# Patient Record
Sex: MALE | Race: BLACK OR AFRICAN AMERICAN | NOT HISPANIC OR LATINO | Employment: OTHER | URBAN - METROPOLITAN AREA
[De-identification: names, ages, dates, MRNs, and addresses within clinical notes are randomized per-mention and may not be internally consistent; named-entity substitution may affect disease eponyms.]

---

## 2024-02-06 ENCOUNTER — APPOINTMENT (OUTPATIENT)
Dept: UROLOGY | Facility: CLINIC | Age: 84
End: 2024-02-06
Payer: MEDICARE

## 2024-02-06 NOTE — PROGRESS NOTES
HPI  83 y.o. M sp IPP replacement by Dr. Chowdary in 2017 here for erectile dysfunction.     Last visit 11/2022:  -healing well  -wound care and warning signs reviewed    Todays Visit:  #Erectile Dysfunction/malfunctioning IPP  -sp Removal of inflatable penile prosthesis cylinders and scrotal pump and insertion of penile malleable implants (22.5 cm) 9/15/22  -  -    -Patient had an inflatable penile implant inserted in 2017, stopped working. Pain with inflation.  - Libido/Desire: strong   - been on Testosterone /anabolic steroids? no   -Pain or curvature in penis when erect: no  -Premature or delayed ejaculation: delayed, patient states volume is diminished  -no hematuria  -no regular blood thinners  -no heart problems  -no prior abdominal surgeries     MRI 7/18/22 personally reviewed:  1. Asymmetric positioning of the penile cylinders within the corpora cavernosa, with posterior displacement of the left penile cylinder by approximately 1.7 cm distal to the tip of the glans and resultant leftward tilting of the penile shaft. 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  2. Fluid reservoir is located within the anterior right hemipelvis, with overall intact tubing  3. Moderate bilateral hydroceles with asymmetric atrophy of the right testicle.      Current Medications:  No current outpatient medications on file.     No current facility-administered medications for this visit.        PMH:  Past Medical History:   Diagnosis Date    Benign prostatic hyperplasia without lower urinary tract symptoms 09/02/2014    Enlarged prostate    Personal history of (healed) traumatic fracture 09/02/2014    History of fracture of pelvis    Unspecified cataract     Cataracts, both eyes       PSH:  Past Surgical History:   Procedure Laterality Date    COLONOSCOPY  09/02/2014    Complete Colonoscopy    OTHER SURGICAL HISTORY  09/02/2014    Laser Coagulation Of Prostate       FMH:  No family history on  file.    Allergies:  Not on File    Physical Exam       Assessment/Plan  #Malfunctioning IPP, sp removal of IPP and insertion of malleable implant  -well healed  -cleared to use     fu in 3 months    Scribe Attestation  By signing my name below, I, Carlie Toscano-Herbert, Scribe, attest that this documentation  has been prepared under the direction and in the presence of Vineet Tamez MD.

## 2024-03-22 ENCOUNTER — OFFICE VISIT (OUTPATIENT)
Dept: DERMATOLOGY | Facility: CLINIC | Age: 84
End: 2024-03-22
Payer: MEDICARE

## 2024-03-22 DIAGNOSIS — L72.0 EPIDERMAL CYST: Primary | ICD-10-CM

## 2024-03-22 DIAGNOSIS — L70.8 OTHER ACNE: ICD-10-CM

## 2024-03-22 DIAGNOSIS — L98.8 RHYTIDES: ICD-10-CM

## 2024-03-22 PROCEDURE — 1159F MED LIST DOCD IN RCRD: CPT | Performed by: STUDENT IN AN ORGANIZED HEALTH CARE EDUCATION/TRAINING PROGRAM

## 2024-03-22 PROCEDURE — 99203 OFFICE O/P NEW LOW 30 MIN: CPT | Performed by: STUDENT IN AN ORGANIZED HEALTH CARE EDUCATION/TRAINING PROGRAM

## 2024-03-22 ASSESSMENT — ITCH NUMERIC RATING SCALE: HOW SEVERE IS YOUR ITCHING?: 0

## 2024-03-22 ASSESSMENT — DERMATOLOGY PATIENT ASSESSMENT
DO YOU USE A TANNING BED: NO
HAVE YOU HAD OR DO YOU HAVE A STAPH INFECTION: NO
HAVE YOU HAD OR DO YOU HAVE VASCULAR DISEASE: NO
DO YOU USE SUNSCREEN: OCCASIONALLY
DO YOU HAVE ANY NEW OR CHANGING LESIONS: NO
ARE YOU AN ORGAN TRANSPLANT RECIPIENT: NO

## 2024-03-22 ASSESSMENT — DERMATOLOGY QUALITY OF LIFE (QOL) ASSESSMENT
RATE HOW BOTHERED YOU ARE BY SYMPTOMS OF YOUR SKIN PROBLEM (EG, ITCHING, STINGING BURNING, HURTING OR SKIN IRRITATION): 0 - NEVER BOTHERED
DATE THE QUALITY-OF-LIFE ASSESSMENT WAS COMPLETED: 66921
RATE HOW EMOTIONALLY BOTHERED YOU ARE BY YOUR SKIN PROBLEM (FOR EXAMPLE, WORRY, EMBARRASSMENT, FRUSTRATION): 0 - NEVER BOTHERED
RATE HOW BOTHERED YOU ARE BY EFFECTS OF YOUR SKIN PROBLEMS ON YOUR ACTIVITIES (EG, GOING OUT, ACCOMPLISHING WHAT YOU WANT, WORK ACTIVITIES OR YOUR RELATIONSHIPS WITH OTHERS): 0 - NEVER BOTHERED
ARE THERE EXCLUSIONS OR EXCEPTIONS FOR THE QUALITY OF LIFE ASSESSMENT: NO

## 2024-03-22 ASSESSMENT — PATIENT GLOBAL ASSESSMENT (PGA): PATIENT GLOBAL ASSESSMENT: PATIENT GLOBAL ASSESSMENT:  1 - CLEAR

## 2024-03-22 NOTE — PROGRESS NOTES
Subjective     Gustavo Ramirez is a 83 y.o. male who presents for the following: Acne (Forehead).     Review of Systems:  No other skin or systemic complaints other than what is documented elsewhere in the note.    The following portions of the chart were reviewed this encounter and updated as appropriate:          Skin Cancer History  No skin cancer on file.      Specialty Problems    None       Objective   Well appearing patient in no apparent distress; mood and affect are within normal limits.    A focused skin examination was performed. All findings within normal limits unless otherwise noted below.    Assessment/Plan   1. Epidermal cyst  Left Zygomatic Area  4mm cystic papule    Patient wouldlikecosmeticremoval  Will chedule     Related Procedures  Follow Up In Dermatology - Established Patient    2. Rhytides (2)  Left Zygomatic Area, Right Temple  Lateralcanthus with slight skin sagging    If patient would like ,can see occuloplastic surgeon to help     3. Other acne  Mid Forehead  Cystic papules on forehead    Recommenddiffrein gel OTC

## 2024-03-22 NOTE — PATIENT INSTRUCTIONS
1) please purchase Differin 0.1% gel from over the counter  Its very drying  Use a very thin layer on affected bumps on forehead once weekly for 4 weeks  Then twice weekly for 4 weeks  Then try every 2 days   If its too drying, follow it with Cerave cream moisturizer    2) as far as the skin around the eyes, an occuloplastic surgeon is the right doctor to help with it  I dont have any one name-  My suggestion,call [facility] scheduling and ask to make appointment with occuloplastic surgeon and they can meet you to discuss - tell them why you are calling    3) regarding the bump under your eye- its a healthy little cyst  I am happy to remove it for you  Please make an appointment, just a regular follow up is ok but ideally id like 30 minutes for it  I would have to make a small incision and remove it

## 2024-08-13 ENCOUNTER — APPOINTMENT (OUTPATIENT)
Dept: UROLOGY | Facility: CLINIC | Age: 84
End: 2024-08-13
Payer: MEDICARE

## 2024-08-13 VITALS — TEMPERATURE: 97.2 F | BODY MASS INDEX: 24.11 KG/M2 | WEIGHT: 150 LBS | HEIGHT: 66 IN

## 2024-08-13 DIAGNOSIS — N52.9 ERECTILE DYSFUNCTION, UNSPECIFIED ERECTILE DYSFUNCTION TYPE: Primary | ICD-10-CM

## 2024-08-13 DIAGNOSIS — Z96.0 HISTORY OF PENILE IMPLANT: ICD-10-CM

## 2024-08-13 LAB
POC APPEARANCE, URINE: CLEAR
POC BILIRUBIN, URINE: NEGATIVE
POC BLOOD, URINE: ABNORMAL
POC COLOR, URINE: YELLOW
POC GLUCOSE, URINE: NEGATIVE MG/DL
POC KETONES, URINE: NEGATIVE MG/DL
POC LEUKOCYTES, URINE: NEGATIVE
POC NITRITE,URINE: NEGATIVE
POC PH, URINE: 6 PH
POC PROTEIN, URINE: NEGATIVE MG/DL
POC SPECIFIC GRAVITY, URINE: 1.02
POC UROBILINOGEN, URINE: 0.2 EU/DL

## 2024-08-13 PROCEDURE — 1036F TOBACCO NON-USER: CPT | Performed by: UROLOGY

## 2024-08-13 PROCEDURE — 81002 URINALYSIS NONAUTO W/O SCOPE: CPT | Performed by: UROLOGY

## 2024-08-13 PROCEDURE — 99213 OFFICE O/P EST LOW 20 MIN: CPT | Performed by: UROLOGY

## 2024-08-13 PROCEDURE — 1125F AMNT PAIN NOTED PAIN PRSNT: CPT | Performed by: UROLOGY

## 2024-08-13 RX ORDER — ASPIRIN 325 MG
50000 TABLET, DELAYED RELEASE (ENTERIC COATED) ORAL WEEKLY
COMMUNITY
Start: 2024-07-11

## 2024-08-13 RX ORDER — LANOLIN ALCOHOL/MO/W.PET/CERES
1000 CREAM (GRAM) TOPICAL
COMMUNITY
Start: 2024-05-10

## 2024-08-13 RX ORDER — TAMSULOSIN HYDROCHLORIDE 0.4 MG/1
CAPSULE ORAL
COMMUNITY
Start: 2024-05-28

## 2024-08-13 ASSESSMENT — PAIN SCALES - GENERAL: PAINLEVEL: 7
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
## 2024-08-13 NOTE — PROGRESS NOTES
"Last visit    Today's visit:        Labs  No results found for: \"TESTOSTERONE\"  No results found for: \"LH\"  No results found for: \"FSH\"  No components found for: \"ESTRADIAL\"  No results found for: \"PSA\"  No components found for: \"CBC\"  No results found for: \"PROLACTIN\"  Lab Results   Component Value Date    HGBA1C 5.9 (H) 04/26/2024     No components found for: \"HEMATOCRIT\"      Medications:  No current outpatient medications on file.    Allergy:  No Known Allergies     Exam  CONSTITUTIONAL:        No acute distress    HEAD:        Normocephalic and atraumatic    CHEST / RESPIRATORY      no excess work of breathing, no respiratory distress,    ABDOMEN / GASTROINTESTINAL:        Abdomen nondistended          Assessment/Plan       Scribe Attestation  By signing my name below, I, Kelly Driscoll, Scribe   attest that this documentation has been prepared under the direction and in the presence of Vineet Tamez MD.   "

## 2024-08-29 ENCOUNTER — HOSPITAL ENCOUNTER (OUTPATIENT)
Dept: RADIOLOGY | Facility: CLINIC | Age: 84
End: 2024-08-29
Payer: MEDICARE

## 2024-09-05 ENCOUNTER — HOSPITAL ENCOUNTER (OUTPATIENT)
Dept: RADIOLOGY | Facility: HOSPITAL | Age: 84
Discharge: HOME | End: 2024-09-05
Payer: MEDICARE

## 2024-09-05 DIAGNOSIS — Z96.0 HISTORY OF PENILE IMPLANT: ICD-10-CM

## 2024-09-05 DIAGNOSIS — N52.9 ERECTILE DYSFUNCTION, UNSPECIFIED ERECTILE DYSFUNCTION TYPE: ICD-10-CM

## 2024-09-05 PROCEDURE — 72197 MRI PELVIS W/O & W/DYE: CPT

## 2024-09-05 PROCEDURE — A9575 INJ GADOTERATE MEGLUMI 0.1ML: HCPCS | Mod: SE | Performed by: UROLOGY

## 2024-09-05 PROCEDURE — 2550000001 HC RX 255 CONTRASTS: Mod: SE | Performed by: UROLOGY

## 2024-09-05 RX ORDER — GADOTERATE MEGLUMINE 376.9 MG/ML
14 INJECTION INTRAVENOUS
Status: COMPLETED | OUTPATIENT
Start: 2024-09-05 | End: 2024-09-05

## 2024-09-17 ENCOUNTER — APPOINTMENT (OUTPATIENT)
Dept: UROLOGY | Facility: CLINIC | Age: 84
End: 2024-09-17
Payer: MEDICARE

## 2024-09-17 VITALS — TEMPERATURE: 97.8 F

## 2024-09-17 DIAGNOSIS — R39.9 LOWER URINARY TRACT SYMPTOMS (LUTS): ICD-10-CM

## 2024-09-17 DIAGNOSIS — T83.410S FAILURE OF PENILE IMPLANT, SEQUELA: Primary | ICD-10-CM

## 2024-09-17 DIAGNOSIS — R39.89 OTHER SYMPTOMS AND SIGNS INVOLVING THE GENITOURINARY SYSTEM: ICD-10-CM

## 2024-09-17 PROCEDURE — 99215 OFFICE O/P EST HI 40 MIN: CPT | Performed by: UROLOGY

## 2024-09-17 PROCEDURE — 87086 URINE CULTURE/COLONY COUNT: CPT

## 2024-09-17 PROCEDURE — 1159F MED LIST DOCD IN RCRD: CPT | Performed by: UROLOGY

## 2024-09-17 PROCEDURE — 1126F AMNT PAIN NOTED NONE PRSNT: CPT | Performed by: UROLOGY

## 2024-09-17 PROCEDURE — 1036F TOBACCO NON-USER: CPT | Performed by: UROLOGY

## 2024-09-17 RX ORDER — TAMSULOSIN HYDROCHLORIDE 0.4 MG/1
0.4 CAPSULE ORAL DAILY
Qty: 90 CAPSULE | Refills: 3 | Status: SHIPPED | OUTPATIENT
Start: 2024-09-17

## 2024-09-17 ASSESSMENT — PAIN SCALES - GENERAL: PAINLEVEL: 0-NO PAIN
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
## 2024-09-18 LAB — BACTERIA UR CULT: NO GROWTH

## 2024-09-23 ENCOUNTER — APPOINTMENT (OUTPATIENT)
Dept: DERMATOLOGY | Facility: CLINIC | Age: 84
End: 2024-09-23
Payer: MEDICARE

## 2024-09-25 ENCOUNTER — PREP FOR PROCEDURE (OUTPATIENT)
Dept: UROLOGY | Facility: CLINIC | Age: 84
End: 2024-09-25
Payer: MEDICARE

## 2024-09-25 VITALS — BODY MASS INDEX: 24.27 KG/M2 | WEIGHT: 154.98 LBS

## 2024-09-25 DIAGNOSIS — T83.420S: Primary | ICD-10-CM

## 2024-09-25 PROBLEM — T83.420A: Status: ACTIVE | Noted: 2024-09-25

## 2024-09-25 RX ORDER — CHLORHEXIDINE GLUCONATE 40 MG/ML
SOLUTION TOPICAL DAILY PRN
OUTPATIENT
Start: 2024-09-25

## 2024-09-25 RX ORDER — VANCOMYCIN HYDROCHLORIDE 1 G/200ML
1000 INJECTION, SOLUTION INTRAVENOUS ONCE
OUTPATIENT
Start: 2024-09-25 | End: 2024-09-25

## 2024-09-25 RX ORDER — CELECOXIB 400 MG/1
400 CAPSULE ORAL ONCE
OUTPATIENT
Start: 2024-09-25 | End: 2024-09-25

## 2024-09-25 RX ORDER — GABAPENTIN 300 MG/1
600 CAPSULE ORAL ONCE
OUTPATIENT
Start: 2024-09-25 | End: 2024-09-25

## 2024-09-25 RX ORDER — SODIUM CHLORIDE, SODIUM LACTATE, POTASSIUM CHLORIDE, CALCIUM CHLORIDE 600; 310; 30; 20 MG/100ML; MG/100ML; MG/100ML; MG/100ML
20 INJECTION, SOLUTION INTRAVENOUS CONTINUOUS
OUTPATIENT
Start: 2024-09-25

## 2024-09-25 RX ORDER — ACETAMINOPHEN 325 MG/1
975 TABLET ORAL ONCE
OUTPATIENT
Start: 2024-09-25 | End: 2024-09-25

## 2024-11-06 ENCOUNTER — CLINICAL SUPPORT (OUTPATIENT)
Dept: PREADMISSION TESTING | Facility: HOSPITAL | Age: 84
End: 2024-11-06
Payer: MEDICARE

## 2024-11-06 DIAGNOSIS — T83.420S: ICD-10-CM

## 2024-11-06 RX ORDER — PANTOPRAZOLE SODIUM 40 MG/1
40 TABLET, DELAYED RELEASE ORAL
COMMUNITY
Start: 2024-10-23

## 2024-11-06 RX ORDER — LORATADINE 10 MG/1
10 TABLET ORAL AS NEEDED
COMMUNITY
Start: 2015-05-26 | End: 2024-11-13 | Stop reason: WASHOUT

## 2024-11-06 RX ORDER — TERBINAFINE HYDROCHLORIDE 250 MG/1
TABLET ORAL
COMMUNITY
Start: 2024-04-19 | End: 2024-11-06 | Stop reason: WASHOUT

## 2024-11-13 ENCOUNTER — LAB (OUTPATIENT)
Dept: LAB | Facility: LAB | Age: 84
End: 2024-11-13
Payer: MEDICARE

## 2024-11-13 ENCOUNTER — APPOINTMENT (OUTPATIENT)
Dept: UROLOGY | Facility: CLINIC | Age: 84
End: 2024-11-13
Payer: MEDICARE

## 2024-11-13 ENCOUNTER — PRE-ADMISSION TESTING (OUTPATIENT)
Dept: PREADMISSION TESTING | Facility: HOSPITAL | Age: 84
End: 2024-11-13
Payer: MEDICARE

## 2024-11-13 VITALS
TEMPERATURE: 97 F | SYSTOLIC BLOOD PRESSURE: 142 MMHG | HEIGHT: 67 IN | BODY MASS INDEX: 24.01 KG/M2 | DIASTOLIC BLOOD PRESSURE: 79 MMHG | RESPIRATION RATE: 16 BRPM | WEIGHT: 153 LBS | OXYGEN SATURATION: 98 % | HEART RATE: 72 BPM

## 2024-11-13 DIAGNOSIS — R73.9 ELEVATED BLOOD SUGAR: ICD-10-CM

## 2024-11-13 DIAGNOSIS — T83.420S: ICD-10-CM

## 2024-11-13 DIAGNOSIS — R39.9 SYMPTOMS INVOLVING URINARY SYSTEM: ICD-10-CM

## 2024-11-13 DIAGNOSIS — Z01.818 PRE-OP TESTING: Primary | ICD-10-CM

## 2024-11-13 DIAGNOSIS — Z01.818 PRE-OP TESTING: ICD-10-CM

## 2024-11-13 LAB
ANION GAP SERPL CALC-SCNC: 11 MMOL/L (ref 10–20)
APPEARANCE UR: CLEAR
ATRIAL RATE: 67 BPM
BASOPHILS # BLD AUTO: 0.01 X10*3/UL (ref 0–0.1)
BASOPHILS NFR BLD AUTO: 0.2 %
BILIRUB UR STRIP.AUTO-MCNC: NEGATIVE MG/DL
BUN SERPL-MCNC: 17 MG/DL (ref 6–23)
CALCIUM SERPL-MCNC: 9.1 MG/DL (ref 8.6–10.3)
CHLORIDE SERPL-SCNC: 107 MMOL/L (ref 98–107)
CO2 SERPL-SCNC: 26 MMOL/L (ref 21–32)
COLOR UR: ABNORMAL
CREAT SERPL-MCNC: 1.16 MG/DL (ref 0.5–1.3)
EGFRCR SERPLBLD CKD-EPI 2021: 63 ML/MIN/1.73M*2
EOSINOPHIL # BLD AUTO: 0.27 X10*3/UL (ref 0–0.4)
EOSINOPHIL NFR BLD AUTO: 6.7 %
ERYTHROCYTE [DISTWIDTH] IN BLOOD BY AUTOMATED COUNT: 13.7 % (ref 11.5–14.5)
EST. AVERAGE GLUCOSE BLD GHB EST-MCNC: 126 MG/DL
GLUCOSE SERPL-MCNC: 110 MG/DL (ref 74–99)
GLUCOSE UR STRIP.AUTO-MCNC: NORMAL MG/DL
HBA1C MFR BLD: 6 %
HCT VFR BLD AUTO: 44.9 % (ref 41–52)
HGB BLD-MCNC: 13.5 G/DL (ref 13.5–17.5)
IMM GRANULOCYTES # BLD AUTO: 0.01 X10*3/UL (ref 0–0.5)
IMM GRANULOCYTES NFR BLD AUTO: 0.2 % (ref 0–0.9)
KETONES UR STRIP.AUTO-MCNC: NEGATIVE MG/DL
LEUKOCYTE ESTERASE UR QL STRIP.AUTO: NEGATIVE
LYMPHOCYTES # BLD AUTO: 1.36 X10*3/UL (ref 0.8–3)
LYMPHOCYTES NFR BLD AUTO: 33.7 %
MCH RBC QN AUTO: 25.7 PG (ref 26–34)
MCHC RBC AUTO-ENTMCNC: 30.1 G/DL (ref 32–36)
MCV RBC AUTO: 86 FL (ref 80–100)
MONOCYTES # BLD AUTO: 0.33 X10*3/UL (ref 0.05–0.8)
MONOCYTES NFR BLD AUTO: 8.2 %
MUCOUS THREADS #/AREA URNS AUTO: NORMAL /LPF
NEUTROPHILS # BLD AUTO: 2.05 X10*3/UL (ref 1.6–5.5)
NEUTROPHILS NFR BLD AUTO: 51 %
NITRITE UR QL STRIP.AUTO: NEGATIVE
NRBC BLD-RTO: 0 /100 WBCS (ref 0–0)
P AXIS: 72 DEGREES
P OFFSET: 197 MS
P ONSET: 140 MS
PH UR STRIP.AUTO: 6 [PH]
PLATELET # BLD AUTO: 204 X10*3/UL (ref 150–450)
POTASSIUM SERPL-SCNC: 4.7 MMOL/L (ref 3.5–5.3)
PR INTERVAL: 168 MS
PROT UR STRIP.AUTO-MCNC: NEGATIVE MG/DL
Q ONSET: 224 MS
QRS COUNT: 11 BEATS
QRS DURATION: 102 MS
QT INTERVAL: 404 MS
QTC CALCULATION(BAZETT): 426 MS
QTC FREDERICIA: 419 MS
R AXIS: 21 DEGREES
RBC # BLD AUTO: 5.25 X10*6/UL (ref 4.5–5.9)
RBC # UR STRIP.AUTO: ABNORMAL /UL
RBC #/AREA URNS AUTO: NORMAL /HPF
SODIUM SERPL-SCNC: 139 MMOL/L (ref 136–145)
SP GR UR STRIP.AUTO: 1.02
T AXIS: 30 DEGREES
T OFFSET: 426 MS
UROBILINOGEN UR STRIP.AUTO-MCNC: NORMAL MG/DL
VENTRICULAR RATE: 67 BPM
WBC # BLD AUTO: 4 X10*3/UL (ref 4.4–11.3)
WBC #/AREA URNS AUTO: NORMAL /HPF

## 2024-11-13 PROCEDURE — 87081 CULTURE SCREEN ONLY: CPT | Mod: AHULAB | Performed by: NURSE PRACTITIONER

## 2024-11-13 PROCEDURE — 93010 ELECTROCARDIOGRAM REPORT: CPT | Performed by: INTERNAL MEDICINE

## 2024-11-13 PROCEDURE — 83036 HEMOGLOBIN GLYCOSYLATED A1C: CPT

## 2024-11-13 PROCEDURE — 93005 ELECTROCARDIOGRAM TRACING: CPT | Performed by: NURSE PRACTITIONER

## 2024-11-13 RX ORDER — CHLORHEXIDINE GLUCONATE ORAL RINSE 1.2 MG/ML
SOLUTION DENTAL
Qty: 475 ML | Refills: 0 | Status: SHIPPED | OUTPATIENT
Start: 2024-11-13

## 2024-11-13 ASSESSMENT — ENCOUNTER SYMPTOMS
DYSPNEA WITH EXERTION: 0
BRUISES/BLEEDS EASILY: 0
DYSPNEA AT REST: 0
VOMITING: 0
GASTROINTESTINAL NEGATIVE: 1
CONSTIPATION: 0
RESPIRATORY NEGATIVE: 1
DIARRHEA: 0
NECK NEGATIVE: 1
NAUSEA: 0
PALPITATIONS: 0
CONSTITUTIONAL NEGATIVE: 1
NEUROLOGICAL NEGATIVE: 1
MUSCULOSKELETAL NEGATIVE: 1
ABDOMINAL PAIN: 0

## 2024-11-13 NOTE — PREPROCEDURE INSTRUCTIONS
Medication List            Accurate as of November 13, 2024  8:16 AM. Always use your most recent med list.                chlorhexidine 0.12 % solution  Commonly known as: Peridex  Swish for 30 seconds and spit 15mL of solution the night before and morning of surgery     cholecalciferol 50,000 unit capsule  Commonly known as: Vitamin D-3  Medication Adjustments for Surgery: Do Not take on the morning of surgery     cyanocobalamin 1,000 mcg tablet  Commonly known as: Vitamin B-12  Medication Adjustments for Surgery: Do Not take on the morning of surgery     pantoprazole 40 mg EC tablet  Commonly known as: ProtoNix  Medication Adjustments for Surgery: Take on the morning of surgery     tamsulosin 0.4 mg 24 hr capsule  Commonly known as: Flomax  Take 1 capsule (0.4 mg) by mouth once daily.  Medication Adjustments for Surgery: Take on the morning of surgery                        **Concerning above medication instructions, if medication is normally taken at night, continue normal schedule.**  **DO NOT TAKE NIGHT PRIOR AND MORNING OF SURGERY**    CONTACT SURGEON'S OFFICE IF YOU DEVELOP:  * Fever = 100.4 F   * New respiratory symptoms (e.g. cough, shortness of breath, respiratory distress, sore throat)  * Recent loss of taste or smell  *Flu like symptoms such as headache, fatigue or gastrointestinal symptoms  * You develop any open sores, shingles, burning or painful urination   AND/OR:  * You no longer wish to have the surgery.  * Any other personal circumstances change that may lead to the need to cancel or defer this surgery.  *You were admitted to any hospital within one week of your planned procedure.    SMOKING:  *Quitting smoking can make a huge difference to your health and recovery from surgery.    *If you need help with quitting, call 0-800-QUIT-NOW.    THE DAY OF SURGERY:  *Do not eat any food after midnight the night before surgery.   *You must drink 13.5 ounces of clear liquids (i.e. water, black coffee  (no milk or cream), tea, apple juice or electrolyte drinks (gatorade)) 2 hours before your arrival time.  *You may chew gum until 2 hours before your surgery    SURGICAL TIME  *You will be contacted between 2 p.m. and 6 p.m. the business day before your surgery with your arrival time.  *If you haven't received a call by 6pm, call [phone].  *Scheduled surgery times may change and you will be notified if this occurs-check your personal voicemail for any updates.    ON THE MORNING OF SURGERY:  *Wear comfortable, loose fitting clothing.   *Do not use moisturizers, creams, lotions or perfume.  *All jewelry and valuables should be left at home.  *Prosthetic devices such as contact lenses, hearing aids, dentures, eyelash extensions, hairpins and body piercing must be removed before surgery.    BRING WITH YOU:  *Photo ID and insurance card  *Current list of medicines and allergies  *Pacemaker/Defibrillator/Heart stent cards  *CPAP machine and mask  *Slings/splints/crutches  *Copy of your complete Advanced Directive/DHPOA-if applicable  *Neurostimulator implant remote    PARKING AND ARRIVAL:  *Check in at the Main Entrance desk and let them know you are here for surgery.  *You will be directed to the 2nd floor surgical waiting area.    AFTER OUTPATIENT SURGERY:  *A responsible adult MUST accompany you at the time of discharge and stay with you for 24 hours after your surgery.  *You may NOT drive yourself home after surgery.  *You may use a taxi or ride sharing service ([employer], [employer]) to return home ONLY if you are accompanied by a friend or family member.  *Instructions for resuming your medications will be provided by your surgeon.         Patient Information: Oral/Dental Rinse  **This is a prescription; pick it up at your preferred local pharmacy **  What is oral/dental rinse?   It is a mouthwash. It is a way of cleaning the mouth with a germ killing solution before your surgery.  The solution contains chlorhexidine,  commonly known as CHG.   It is used inside the mouth to kill a bacteria known as Staphylococcus aureus.  Let your doctor know if you are allergic to Chlorhexidine.    Why do I need to use CHG oral/dental rinse?  The CHG oral/dental rinse helps to kill a bacteria in your mouth known a Staphylococcus aureus.     This reduces the risk of infection at the surgical site.      Using your CHG oral/dental rinse  STEPS:  Use your CHG oral/dental rinse after you brush your teeth the night before (at bedtime) and the morning of your surgery.  Follow all directions on your prescription label.    Use the cap on the container to measure 15ml (fill cap to fill line)  Swish (gargle if you can) the mouthwash in your mouth for at least 30 seconds, (do not to swallow) spit out  After you use your CHG rinse, do not rinse your mouth with water, drink or eat.  Please refer to prescription label for the appropriate time to resume oral intake  Dental rinse comes in one size bottle: 473ml ~16oz.  You will have leftover    rinse, discard after this use.    What side effects might I have using the CHG oral/dental rinse?  CHG rinse will stick to plaque on the teeth.  Brush and floss just before use.  Teeth brushing will help avoid staining of plaque during use.    Who should I contact if I have questions about the CHG oral/dental rinse?  Please call [facility], Preadmission Testing at [phone] if you have any questions    Home Preoperative Antibacterial Shower     What is a home preoperative antibacterial shower?  This shower is a way of cleaning the skin with a germ killing soap before surgery.  The soap contains chlorhexidine, commonly known as CHG.  CHG is a soap for your skin with germ killing ability.  Let your doctor know if you are allergic to chlorhexidine.    Why do I need to take a preoperative antibacterial shower?  Skin is not sterile.  It is best to try to make your skin as free of germs as  possible before surgery.  Proper cleansing with a germ killing soap before surgery can lower the number of germs on your skin.  This helps to reduce the risk of infection at the surgical site.  Following the instructions listed below will help you prepare your skin for surgery.      How do I use the CHG skin cleanser?  Steps:  Begin using your CHG soap five days before your scheduled surgery on ________________________.    Days 1-4 Shower before bed:  Wash your face and genitals with your normal soap and rinse.    Wash and rinse your hair using the CHG soap. Rinse completely, do not condition your   Hair.          3.    Apply the CHG soap to a clean wet washcloth.  Turn the water off or move away                From the water spray to avoid premature rinsing of the CHG soap as you are applying.     4.   Lather your entire body from the neck down.  Do not use on your face or genitals.   Pay special attention to the area(s) where your incision(s) will be located unless they are on your face.  Avoid scrubbing your skin too hard.  The important point is to have the CHG soap sit on your skin for 3 minutes.    When the 3 minutes are up, turn on the water and rinse the CHG soap off your body completely.   Pat yourself dry with a clean, freshly-laundered towel.  Dress in clean, freshly laundered night clothes.    Be sure to sleep with clean, freshly laundered sheets.  Day 5:  Last shower is the morning before surgery: Follow above Instructions.    NOTE:    *Hair extensions should be removed    *Keep CHG soap out of eyes and ear canals   *DO NOT wash with regular soap on your body after you have used the CHG        soap solution  *DO NOT apply powders, lotions, or perfume.  *Deodorant may be used days 1-4, BUT NOT the day of surgery    Who should I contact if I have any questions regarding the use of CHG soap?  Call the [facility], Preadmission Testing at [phone] if you have any questions.               Patient Information: Pre-Operative Infection Prevention Measures     Why did I have my nose, under my arms and groin swabbed?  The purpose of the swab is to identify Staphylococcus aureus inside your nose or on your skin.  The swab was sent to the laboratory for culture.  A positive swab/culture for Staphylococcus aureus is called colonization or carriage.      What is Staphylococcus aureus?  Staphylococcus aureus, also known as “staph”, is a germ found on the skin or in the nose of healthy people.  Sometimes Staphylococcus aureus can get into the body and cause an infection.  This can be minor (such as pimples, boils or other skin problems).  It might also be serious (such as blood infection, pneumonia or a surgical site infection).    What is Staphylococcus aureus colonization or carriage?  Colonization or carriage means that a person has the germ but is not sick from it.  These bacteria can be spread on the hands or when breathing or sneezing.    How is Staphylococcus aureus spread?  It is most often spread by close contact with a person or item that carries it.    What happens if my culture is positive for Staphylococcus aureus?  Your doctor/medical team will use this information to guide any antibiotic treatment which may be necessary.  Regardless of the culture results, we will clean the inside of your nose with a betadine swab just before you have your surgery.      Will I get an infection if I have Staphylococcus aureus in my nose or on my skin?  Anyone can get an infection with Staphylococcus aureus.  However, the best way to reduce your risk of infection is to follow the instructions provided to you for the use of your CHG soap and dental rinse.        Who should I contact if I have any questions?  Call the [facility], Preadmission Testing at [phone] if you have any questions.
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
## 2024-11-13 NOTE — CPM/PAT NURSE NOTE
CPM/PAT Nurse Note      Name: Gustavo Ramirez (Gustavo Ramirez)  /Age: 1940/83 y.o.       Past Medical History:   Diagnosis Date    Anxiety     pt denies    Benign prostatic hyperplasia without lower urinary tract symptoms 2014    Enlarged prostate    Dyspnea     Agent orange exposure - chronic dyspnea.  Blebs and calcified granulomas - yearly CT.  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    Family History   Problem Relation Name Age of Onset    Heart disease Mother      Cancer Sister      Cancer Sister         No Known Allergies    Prior to Admission medications    Medication Sig Start Date End Date Taking? Authorizing Provider   cholecalciferol (Vitamin D-3) 50,000 unit capsule Take 1 capsule (50,000 Units) by mouth once a week. 24  Yes Historical Provider, MD   cyanocobalamin (Vitamin B-12) 1,000 mcg tablet Take 1 tablet (1,000 mcg) by mouth once daily. 5/10/24  Yes Historical Provider, MD   pantoprazole (ProtoNix) 40 mg EC tablet Take 1 tablet (40 mg) by mouth once daily. 10/23/24  Yes Historical Provider, MD   tamsulosin (Flomax) 0.4 mg 24 hr  capsule Take 1 capsule (0.4 mg) by mouth once daily. 9/17/24  Yes Vineet Tamez MD   chlorhexidine (Peridex) 0.12 % solution Swish for 30 seconds and spit 15mL of solution the night before and morning of surgery 11/13/24   ROSENOD Love-CNP   loratadine (Claritin) 10 mg tablet Take 1 tablet (10 mg) by mouth if needed.  Patient not taking: Reported on 11/13/2024 5/26/15 11/13/24  Historical Provider, MD AVA SOLIMAN     DASI Risk Score    No data to display       Caprini DVT Assessment    No data to display       Modified Frailty Index    No data to display       CHADS2 Stroke Risk  Current as of 6 minutes ago        N/A 3 to 100%: High Risk   2 to < 3%: Medium Risk   0 to < 2%: Low Risk     Last Change: N/A          This score determines the patient's risk of having a stroke if the patient has atrial fibrillation.        This score is not applicable to this patient. Components are not calculated.          Revised Cardiac Risk Index    No data to display       Apfel Simplified Score    No data to display       Risk Analysis Index Results This Encounter    No data found in the last 10 encounters.       Prodigy: High Risk  Total Score: 24              Prodigy Age Score      Prodigy Gender Score          ARISCAT Score for Postoperative Pulmonary Complications    No data to display       Gonzalez Perioperative Risk for Myocardial Infarction or Cardiac Arrest (NAHED)    No data to display         Nurse Plan of Action:     After Visit Summary (AVS) reviewed and patient verbalized good understanding of medications and NPO instructions.  Pre-op infection prevention measures:  CHG showers and mouthwash reviewed, understanding voiced.  CHG soap given and patient verbalized need to pick CHG mouthwash at their preferred local pharmacy.

## 2024-11-15 LAB — STAPHYLOCOCCUS SPEC CULT: NORMAL

## 2024-11-27 ENCOUNTER — HOSPITAL ENCOUNTER (OUTPATIENT)
Facility: HOSPITAL | Age: 84
Setting detail: OUTPATIENT SURGERY
Discharge: HOME | End: 2024-11-27
Attending: UROLOGY | Admitting: UROLOGY
Payer: MEDICARE

## 2024-11-27 ENCOUNTER — ANESTHESIA (OUTPATIENT)
Dept: OPERATING ROOM | Facility: HOSPITAL | Age: 84
End: 2024-11-27
Payer: MEDICARE

## 2024-11-27 ENCOUNTER — ANESTHESIA EVENT (OUTPATIENT)
Dept: OPERATING ROOM | Facility: HOSPITAL | Age: 84
End: 2024-11-27
Payer: MEDICARE

## 2024-11-27 VITALS
HEART RATE: 65 BPM | RESPIRATION RATE: 16 BRPM | WEIGHT: 153 LBS | BODY MASS INDEX: 24.59 KG/M2 | TEMPERATURE: 97.2 F | SYSTOLIC BLOOD PRESSURE: 137 MMHG | OXYGEN SATURATION: 99 % | HEIGHT: 66 IN | DIASTOLIC BLOOD PRESSURE: 80 MMHG

## 2024-11-27 DIAGNOSIS — T83.420A: Primary | ICD-10-CM

## 2024-11-27 DIAGNOSIS — T83.420S: ICD-10-CM

## 2024-11-27 PROCEDURE — 2500000001 HC RX 250 WO HCPCS SELF ADMINISTERED DRUGS (ALT 637 FOR MEDICARE OP): Performed by: ANESTHESIOLOGY

## 2024-11-27 PROCEDURE — 7100000010 HC PHASE TWO TIME - EACH INCREMENTAL 1 MINUTE: Performed by: UROLOGY

## 2024-11-27 PROCEDURE — 7100000002 HC RECOVERY ROOM TIME - EACH INCREMENTAL 1 MINUTE: Performed by: UROLOGY

## 2024-11-27 PROCEDURE — 3600000003 HC OR TIME - INITIAL BASE CHARGE - PROCEDURE LEVEL THREE: Performed by: UROLOGY

## 2024-11-27 PROCEDURE — 99100 ANES PT EXTEME AGE<1 YR&>70: CPT | Performed by: ANESTHESIOLOGY

## 2024-11-27 PROCEDURE — A54416: Performed by: NURSE ANESTHETIST, CERTIFIED REGISTERED

## 2024-11-27 PROCEDURE — 7100000001 HC RECOVERY ROOM TIME - INITIAL BASE CHARGE: Performed by: UROLOGY

## 2024-11-27 PROCEDURE — 2500000004 HC RX 250 GENERAL PHARMACY W/ HCPCS (ALT 636 FOR OP/ED): Performed by: UROLOGY

## 2024-11-27 PROCEDURE — 2720000007 HC OR 272 NO HCPCS: Performed by: UROLOGY

## 2024-11-27 PROCEDURE — 2500000004 HC RX 250 GENERAL PHARMACY W/ HCPCS (ALT 636 FOR OP/ED): Performed by: STUDENT IN AN ORGANIZED HEALTH CARE EDUCATION/TRAINING PROGRAM

## 2024-11-27 PROCEDURE — 7100000009 HC PHASE TWO TIME - INITIAL BASE CHARGE: Performed by: UROLOGY

## 2024-11-27 PROCEDURE — 3700000001 HC GENERAL ANESTHESIA TIME - INITIAL BASE CHARGE: Performed by: UROLOGY

## 2024-11-27 PROCEDURE — A4649 SURGICAL SUPPLIES: HCPCS | Performed by: UROLOGY

## 2024-11-27 PROCEDURE — A54416: Performed by: ANESTHESIOLOGY

## 2024-11-27 PROCEDURE — 3700000002 HC GENERAL ANESTHESIA TIME - EACH INCREMENTAL 1 MINUTE: Performed by: UROLOGY

## 2024-11-27 PROCEDURE — 3600000008 HC OR TIME - EACH INCREMENTAL 1 MINUTE - PROCEDURE LEVEL THREE: Performed by: UROLOGY

## 2024-11-27 PROCEDURE — C2622 PROSTHESIS, PENILE, NON-INF: HCPCS | Performed by: UROLOGY

## 2024-11-27 PROCEDURE — 2500000001 HC RX 250 WO HCPCS SELF ADMINISTERED DRUGS (ALT 637 FOR MEDICARE OP): Performed by: UROLOGY

## 2024-11-27 PROCEDURE — 2780000003 HC OR 278 NO HCPCS: Performed by: UROLOGY

## 2024-11-27 PROCEDURE — 2500000004 HC RX 250 GENERAL PHARMACY W/ HCPCS (ALT 636 FOR OP/ED): Performed by: NURSE ANESTHETIST, CERTIFIED REGISTERED

## 2024-11-27 PROCEDURE — 2500000004 HC RX 250 GENERAL PHARMACY W/ HCPCS (ALT 636 FOR OP/ED): Performed by: ANESTHESIOLOGIST ASSISTANT

## 2024-11-27 DEVICE — MALLEABLE PENILE PROSTHESIS
Type: IMPLANTABLE DEVICE | Site: PENIS | Status: FUNCTIONAL
Brand: TACTRA

## 2024-11-27 RX ORDER — DROPERIDOL 2.5 MG/ML
0.62 INJECTION, SOLUTION INTRAMUSCULAR; INTRAVENOUS ONCE AS NEEDED
Status: DISCONTINUED | OUTPATIENT
Start: 2024-11-27 | End: 2024-11-27 | Stop reason: HOSPADM

## 2024-11-27 RX ORDER — ROCURONIUM BROMIDE 10 MG/ML
INJECTION, SOLUTION INTRAVENOUS AS NEEDED
Status: DISCONTINUED | OUTPATIENT
Start: 2024-11-27 | End: 2024-11-27

## 2024-11-27 RX ORDER — BUPIVACAINE HYDROCHLORIDE 5 MG/ML
INJECTION, SOLUTION EPIDURAL; INTRACAUDAL AS NEEDED
Status: DISCONTINUED | OUTPATIENT
Start: 2024-11-27 | End: 2024-11-27 | Stop reason: HOSPADM

## 2024-11-27 RX ORDER — SULFAMETHOXAZOLE AND TRIMETHOPRIM 800; 160 MG/1; MG/1
1 TABLET ORAL 2 TIMES DAILY
Qty: 10 TABLET | Refills: 0 | Status: SHIPPED | OUTPATIENT
Start: 2024-11-27 | End: 2024-12-02

## 2024-11-27 RX ORDER — FLUCONAZOLE 2 MG/ML
400 INJECTION, SOLUTION INTRAVENOUS ONCE
Status: COMPLETED | OUTPATIENT
Start: 2024-11-27 | End: 2024-11-27

## 2024-11-27 RX ORDER — CELECOXIB 200 MG/1
400 CAPSULE ORAL ONCE
Status: COMPLETED | OUTPATIENT
Start: 2024-11-27 | End: 2024-11-27

## 2024-11-27 RX ORDER — MIDAZOLAM HYDROCHLORIDE 1 MG/ML
INJECTION INTRAMUSCULAR; INTRAVENOUS AS NEEDED
Status: DISCONTINUED | OUTPATIENT
Start: 2024-11-27 | End: 2024-11-27

## 2024-11-27 RX ORDER — SODIUM CHLORIDE, SODIUM LACTATE, POTASSIUM CHLORIDE, CALCIUM CHLORIDE 600; 310; 30; 20 MG/100ML; MG/100ML; MG/100ML; MG/100ML
50 INJECTION, SOLUTION INTRAVENOUS CONTINUOUS
Status: ACTIVE | OUTPATIENT
Start: 2024-11-27 | End: 2024-11-27

## 2024-11-27 RX ORDER — ACETAMINOPHEN 325 MG/1
975 TABLET ORAL ONCE
Status: COMPLETED | OUTPATIENT
Start: 2024-11-27 | End: 2024-11-27

## 2024-11-27 RX ORDER — MEPERIDINE HYDROCHLORIDE 25 MG/ML
12.5 INJECTION INTRAMUSCULAR; INTRAVENOUS; SUBCUTANEOUS EVERY 10 MIN PRN
Status: DISCONTINUED | OUTPATIENT
Start: 2024-11-27 | End: 2024-11-27 | Stop reason: HOSPADM

## 2024-11-27 RX ORDER — VANCOMYCIN HYDROCHLORIDE 1 G/200ML
1000 INJECTION, SOLUTION INTRAVENOUS ONCE
Status: COMPLETED | OUTPATIENT
Start: 2024-11-27 | End: 2024-11-27

## 2024-11-27 RX ORDER — LIDOCAINE HYDROCHLORIDE 10 MG/ML
0.1 INJECTION, SOLUTION EPIDURAL; INFILTRATION; INTRACAUDAL; PERINEURAL ONCE
Status: DISCONTINUED | OUTPATIENT
Start: 2024-11-27 | End: 2024-11-27 | Stop reason: HOSPADM

## 2024-11-27 RX ORDER — ONDANSETRON HYDROCHLORIDE 2 MG/ML
INJECTION, SOLUTION INTRAVENOUS AS NEEDED
Status: DISCONTINUED | OUTPATIENT
Start: 2024-11-27 | End: 2024-11-27

## 2024-11-27 RX ORDER — LIDOCAINE HCL/PF 100 MG/5ML
SYRINGE (ML) INTRAVENOUS AS NEEDED
Status: DISCONTINUED | OUTPATIENT
Start: 2024-11-27 | End: 2024-11-27

## 2024-11-27 RX ORDER — POLYETHYLENE GLYCOL 3350 17 G/17G
17 POWDER, FOR SOLUTION ORAL DAILY
Qty: 30 PACKET | Refills: 0 | Status: SHIPPED | OUTPATIENT
Start: 2024-11-27 | End: 2024-12-27

## 2024-11-27 RX ORDER — PHENYLEPHRINE HCL IN 0.9% NACL 1 MG/10 ML
SYRINGE (ML) INTRAVENOUS AS NEEDED
Status: DISCONTINUED | OUTPATIENT
Start: 2024-11-27 | End: 2024-11-27

## 2024-11-27 RX ORDER — PROPOFOL 10 MG/ML
INJECTION, EMULSION INTRAVENOUS AS NEEDED
Status: DISCONTINUED | OUTPATIENT
Start: 2024-11-27 | End: 2024-11-27

## 2024-11-27 RX ORDER — SODIUM CHLORIDE, SODIUM LACTATE, POTASSIUM CHLORIDE, CALCIUM CHLORIDE 600; 310; 30; 20 MG/100ML; MG/100ML; MG/100ML; MG/100ML
20 INJECTION, SOLUTION INTRAVENOUS CONTINUOUS
Status: DISCONTINUED | OUTPATIENT
Start: 2024-11-27 | End: 2024-11-27 | Stop reason: HOSPADM

## 2024-11-27 RX ORDER — OXYCODONE HYDROCHLORIDE 5 MG/1
5 TABLET ORAL EVERY 4 HOURS PRN
Status: DISCONTINUED | OUTPATIENT
Start: 2024-11-27 | End: 2024-11-27 | Stop reason: HOSPADM

## 2024-11-27 RX ORDER — GABAPENTIN 300 MG/1
600 CAPSULE ORAL ONCE
Status: COMPLETED | OUTPATIENT
Start: 2024-11-27 | End: 2024-11-27

## 2024-11-27 RX ORDER — ONDANSETRON HYDROCHLORIDE 2 MG/ML
4 INJECTION, SOLUTION INTRAVENOUS ONCE AS NEEDED
Status: DISCONTINUED | OUTPATIENT
Start: 2024-11-27 | End: 2024-11-27 | Stop reason: HOSPADM

## 2024-11-27 RX ORDER — KETOROLAC TROMETHAMINE 30 MG/ML
INJECTION, SOLUTION INTRAMUSCULAR; INTRAVENOUS AS NEEDED
Status: DISCONTINUED | OUTPATIENT
Start: 2024-11-27 | End: 2024-11-27

## 2024-11-27 RX ORDER — OXYCODONE HYDROCHLORIDE 5 MG/1
5 TABLET ORAL EVERY 6 HOURS PRN
Qty: 15 TABLET | Refills: 0 | Status: SHIPPED | OUTPATIENT
Start: 2024-11-27 | End: 2024-12-02

## 2024-11-27 RX ORDER — ALBUTEROL SULFATE 0.83 MG/ML
2.5 SOLUTION RESPIRATORY (INHALATION) ONCE AS NEEDED
Status: DISCONTINUED | OUTPATIENT
Start: 2024-11-27 | End: 2024-11-27 | Stop reason: HOSPADM

## 2024-11-27 RX ORDER — FENTANYL CITRATE 50 UG/ML
INJECTION, SOLUTION INTRAMUSCULAR; INTRAVENOUS AS NEEDED
Status: DISCONTINUED | OUTPATIENT
Start: 2024-11-27 | End: 2024-11-27

## 2024-11-27 RX ADMIN — VANCOMYCIN HYDROCHLORIDE 1000 MG: 1 INJECTION, SOLUTION INTRAVENOUS at 08:16

## 2024-11-27 RX ADMIN — GENTAMICIN SULFATE 340 MG: 40 INJECTION, SOLUTION INTRAMUSCULAR; INTRAVENOUS at 08:36

## 2024-11-27 RX ADMIN — GABAPENTIN 600 MG: 300 CAPSULE ORAL at 08:16

## 2024-11-27 RX ADMIN — ACETAMINOPHEN 975 MG: 325 TABLET, FILM COATED ORAL at 08:16

## 2024-11-27 RX ADMIN — OXYCODONE HYDROCHLORIDE 5 MG: 5 TABLET ORAL at 12:36

## 2024-11-27 RX ADMIN — FLUCONAZOLE 400 MG: 400 INJECTION, SOLUTION INTRAVENOUS at 08:27

## 2024-11-27 RX ADMIN — CELECOXIB 400 MG: 200 CAPSULE ORAL at 08:16

## 2024-11-27 ASSESSMENT — PAIN - FUNCTIONAL ASSESSMENT
PAIN_FUNCTIONAL_ASSESSMENT: 0-10

## 2024-11-27 ASSESSMENT — PAIN SCALES - GENERAL
PAINLEVEL_OUTOF10: 3
PAINLEVEL_OUTOF10: 2
PAINLEVEL_OUTOF10: 0 - NO PAIN
PAINLEVEL_OUTOF10: 0 - NO PAIN
PAINLEVEL_OUTOF10: 5 - MODERATE PAIN
PAINLEVEL_OUTOF10: 3
PAINLEVEL_OUTOF10: 5 - MODERATE PAIN

## 2024-11-27 ASSESSMENT — COLUMBIA-SUICIDE SEVERITY RATING SCALE - C-SSRS
6. HAVE YOU EVER DONE ANYTHING, STARTED TO DO ANYTHING, OR PREPARED TO DO ANYTHING TO END YOUR LIFE?: NO
2. HAVE YOU ACTUALLY HAD ANY THOUGHTS OF KILLING YOURSELF?: NO
1. IN THE PAST MONTH, HAVE YOU WISHED YOU WERE DEAD OR WISHED YOU COULD GO TO SLEEP AND NOT WAKE UP?: NO

## 2024-11-27 NOTE — ANESTHESIA PREPROCEDURE EVALUATION
Patient: Gustavo Ramirez    Procedure Information       Date/Time: 11/27/24 0930    Procedure: Dislocated Malleable Penile Prosthesis Removal; Replacement (Penis)    Location: [facility] OR 11 / Virtual [facility] OR    Surgeons: Vineet Tamez MD            Relevant Problems   No relevant active problems       Clinical information reviewed:   Tobacco  Allergies  Meds   Med Hx  Surg Hx   Fam Hx  Soc Hx        NPO Detail:  NPO/Void Status  Date of Last Liquid: 11/27/24  Time of Last Liquid: 0600  Date of Last Solid: 11/26/24  Time of Last Solid: 1600         Physical Exam    Airway  Mallampati: II  TM distance: >3 FB  Neck ROM: full     Cardiovascular   Rhythm: regular  Rate: normal     Dental   (+) upper dentures     Pulmonary    Abdominal        Mild reflux symptoms. Previous Gas no problems. Cardiac workup neg - N echo, ECG, SPECt scan.     Anesthesia Plan    History of general anesthesia?: yes  History of complications of general anesthesia?: no    ASA 2     general     intravenous induction   Anesthetic plan and risks discussed with patient.

## 2024-11-27 NOTE — DISCHARGE INSTRUCTIONS
DEPARTMENT OF Urology  DISCHARGE INSTRUCTIONS PENILE PROSTHESIS  Outpatient Surgery    C O N F I D E N T I A L   I N F O R M A T I O N    Gustavo Ramirez    Call Urology Department [phone] during regular daytime business hours (8:00 am - 5:00 pm) and after 5:00 pm ask for the Urology resident with any questions or concerns.      If it is a life-threatening situation, proceed to the nearest emergency department.          Thank you for the opportunity to care for you today.  Your health and healing are very important to us.  We hope we made you feel as comfortable as possible and are committed to your recovery and continued well-being.      The following is a brief overview of your penile prosthesis procedure. Some of the information contained on this summary may be confidential.  This information should be kept in your records and should be shared with your regular doctor.    Physicians:   Dr. Vineet Tamez      Procedure performed: insertion of penile prosthesis    What to Expect During your Recovery and Home Care  Anesthesia Side Effects   You received anesthesia today.  You may feel sleepy, tired, or have a sore throat.   You may also feel drowsiness, dizziness, or inability to think clearly.  For your safety, do not drive, drink alcoholic beverages, take any unprescribed medication or make any important decisions for 24 hours.  A responsible adult should be with you for 24 hours.       Activity and Recovery    Go home and rest. No strenuous activity and no heavy lifting over 10 lbs.     Pain Control  Unfortunately, you may experience pain after your procedure.  Adequate management can include alternative measures to help ease your pain and can include ice packs, scrotal support, and over the counter Tylenol. Oxycodone may also be prescribe for breakthrough pain. Do not take more than 4,000 mg of Tylenol in a 24-hour period.     Oxycodone is a narcotic medication, which can impair judgment, slow  reaction times, and cause constipation. Take Miralax, Colace, or other stool softeners for constipation. Do not drive or operate machinery while taking narcotic medications.    Nausea/Vomiting   Clear liquids are best tolerated at first. Start slow, advance your diet as tolerated to normal foods. Avoid spicy, greasy, heavy foods at first. Also, you may feel nauseous or like you need to vomit if you take any type of medication on an empty stomach.  Call your physician if you are unable to eat or drink and have persistent vomiting.          Signs of Bleeding   Minor bleeding or drainage may occur from the surgical site; however, excessive or consistent bleeding should be reported to your surgeon. Excessive bleeding is defined as blood that is dripping from wound, soaking you bandages, and is ketchup colored, thick with possible blood clots.  Consistent is defined as bleeding that does not stop.    Treatment/wound care:   Keep area(s) clean and dry. You can wear gauze dressing on top of the incisions, so the scrotal support does not irritate the incisions. It is incredibly important that you keep the scrotum well supported for the first two weeks after the surgery. Continue to wear the scrotal support/jock straps/tight fitting underwear.  If you have a drain in place, we will schedule an appointment for the drain to be removed.  It is okay to shower 48 hours after time of surgery.  Do not submerge incisions in water (tub bath, swimming) until incisions have completely healed.  Do not scrub wound(s), pat dry.  Clean with mild soap. Do not use oils or lotions on incisions.    Please visually inspect your wound(s) at least once daily.  If the wound(s) are in a difficult to see location, please use a mirror or have someone else assist with visual inspection.    Signs of Infection  Signs of infection can include fever, excessive swelling, heat, drainage, redness, or severe pain.  If you see any of these occur, please  contact [phone]. Any fever higher than 100.4, especially if associated with an ill feeling, abdominal pain, chills, or nausea should be reported to your surgeon.        Additional Instructions:   No sex until discussed further at your follow up appointment. Your device has been left partially inflated, do not deflate, or inflate further. You should wear tight fitting underwear for the next six weeks. At your six week follow up you will be taught how to use your device.

## 2024-11-27 NOTE — OP NOTE
Dislocated Malleable Penile Prosthesis Removal; Replacement Operative Note     Date: 2024  OR Location: [facility] OR    Name: Gustavo Ramirez, : 1940, Age: 83 y.o., MRN: [id number], Sex: male    Diagnosis  Pre-op Diagnosis      * Displacement of penile prosthesis implant, sequela [T83.420S] Post-op Diagnosis     * Displacement of penile prosthesis implant, sequela [T83.420S]     Procedures  Dislocated Malleable Penile Prosthesis Removal and replacement, repair of corporal injury, adjacent tissue transfer <10cm, peudenal nerve block  67793 - [place] RMVL & RPLCMT NON-NFLTBL/NFLTBL PENILE PROSTHESI    Surgeons      * Vineet Tamez - Primary    Resident/Fellow/Other Assistant:  Surgeons and Role:     * Carlee Jones MD - Assisting     * Krish Buenrostro MD - Resident - Assisting    Staff:   Surgical Assistant:   Circulator: Alfreda Pruitt Person: Pamella Ames Circulator: Gloria    Anesthesia Staff: Anesthesiologist: Tre Garcia MD  CRNA: ROSENDO Cadet-STANISLAV  C-AA: ALBERT Busby    Procedure Summary  Anesthesia: General  ASA: II  Estimated Blood Loss: 5mL  Intra-op Medications:   Administrations occurring from 0930 to 1230 on 24:   Medication Name Total Dose   dexAMETHasone (Decadron) injection 4 mg/mL 4 mg   fentaNYL (Sublimaze) injection 50 mcg/mL 50 mcg   ketorolac (Toradol) injection 30 mg 15 mg   lactated Ringer's infusion Cannot be calculated   lidocaine (cardiac) injection 2% prefilled syringe 60 mg   midazolam PF (Versed) injection 1 mg/mL 1 mg   ondansetron (Zofran) 2 mg/mL injection 4 mg   phenylephrine 100 mcg/mL syringe 10 mL (prefilled) 100 mcg   propofol (Diprivan) injection 10 mg/mL 110 mg   rocuronium (ZeMuron) 50 mg/5 mL injection 70 mg   sugammadex (Bridion) 200 mg/2 mL injection 200 mg              Anesthesia Record               Intraprocedure I/O Totals          Intake    lactated Ringer's infusion 500.00 mL    Total Intake 500 mL       Output     Est. Blood Loss 5 mL    Total Output 5 mL       Net    Net Volume 495 mL          Specimen: No specimens collected              Drains and/or Catheters:   Urethral Catheter Non-latex 16 Fr. (Active)       Tourniquet Times:         Implants:  Implants       Type Name Action Serial No.      Penile Prosthesis PROSTHESIS KIT, 11MM ÓSCAR KONG - JOSE - [id number] Implanted NA              Findings: Left malleable perforating proximally. Corpora measured 22cm. 2x2cm flap or capsule lifted and used as flap to cover perforation.      Two attending surgeon due to lack of qualified assistant/resident for the complex surgery.     Indications: Gustavo Ramirez is an 83 y.o. male who is having surgery for Displacement of penile prosthesis implant, sequela [T83.420S].     The patient was seen in the preoperative area. The risks, benefits, complications, treatment options, non-operative alternatives, expected recovery and outcomes were discussed with the patient. The possibilities of reaction to medication, pulmonary aspiration, injury to surrounding structures, bleeding, recurrent infection, the need for additional procedures, failure to diagnose a condition, and creating a complication requiring transfusion or operation were discussed with the patient. The patient concurred with the proposed plan, giving informed consent.  The site of surgery was properly noted/marked if necessary per policy. The patient has been actively warmed in preoperative area. Preoperative antibiotics including vanc, gent and fluc.  Venous thrombosis prophylaxis have been ordered including bilateral sequential compression devices    Procedure Details:       The patient was met in preoperative area and consented for the procedure. Preoperative antibiotics were started in the preoperative area The patient was taken to the OR and placed on table in supine position on the OR table.  Anesthesia was administered without incident. He was moved to the  lithotomy position. Genitalia was then shaved and then prepped. The skin was cleaned with chlorhexidine and then ChloraPrep was used after which he was draped in a sterile fashion.      The penis was retracted with a silk glans suture. A 16 fr corbin catheter was placed. A  4 cm perineal incision was made with a 15 blade. A [place] retractor was then set up.  We dissected down to the bulbospongiosus muscle with a combination sharp and blunt dissection. The urethra was retracted to the side. We were able to palpate the left prothesis which was brushing up against the rectum. We dissected up the implant capsule until we encountered corporal tissue. This was tagged with 3-0 vicryl. We incised the capsule and  extracted the implant. This was sent off the field. The prior implant was 22.5 cm.  We were able to see the area of perforation which was just adjacent to the proximal extend of the corpora. The proximal corpora was able to be dilated with the fields dilator. We measured the corpora with the kinga as 22cm. A tactra implant was prepared off the field and cut to 22cm with standard rear tip. A 2-0 PDS was passed through the periosteum of the bone behind the corpora and then through the RTE of the implant to tack down the device. The implant seated nicely in the corpora both proximally and distally.     We turned our attention to repair of the corpora defect created by the perforation. We lifted the capsule which formed around the proximal implant from the underlying tissue. This measured approximally 2x2cm and was used as a rotational flap to cover the corporal defect. We secured the flap with 2-0 PDS distally and then ran 2-0 PDS down either side completely covering the implant. The 3-0 vicryl stay sutures secured previously to the corpora were tied to each over the flap. The distal implant was even with the contralateral side. Since both sides appeared to be positioned well we did not replace the right implant.      10cc of local anesthetic was injected through our incision in the proximity of the pudendal nerve on both the right and left side. The muscle was closed with 3-0 vicryl. A second deep layer was closed with running 3-0 vicryl. We then closed skin with 3-0 Monocryl in a running mattress.     Skin glue was then applied.  Wrapped fluffs and scrotal support were then applied. Cummins was removed.  The patient was awakened and transferred to PACU in stable condition.       Complications:  None; patient tolerated the procedure well.    Disposition: PACU - hemodynamically stable.  Condition: stable       Attending Attestation:     Vineet Tamez  Phone Number: [phone]

## 2024-11-27 NOTE — ANESTHESIA POSTPROCEDURE EVALUATION
Patient: Gustavo Ramirez    Procedure Summary       Date: 11/27/24 Room / Location: [facility] A OR 11 / Virtual [facility] OR    Anesthesia Start: 1003 Anesthesia Stop: 1218    Procedure: Dislocated Malleable Penile Prosthesis Removal and replacement, repair of corporal injury, adjacent tissue transfer <10cm, peudenal nerve block (Penis) Diagnosis:       Displacement of penile prosthesis implant, sequela      (Displacement of penile prosthesis implant, sequela [T83.420S])    Surgeons: Vineet Tamez MD Responsible Provider: Tre Garcia MD    Anesthesia Type: general ASA Status: 2            Anesthesia Type: general    Vitals Value Taken Time   /76 11/27/24 1300   Temp 36.2 °C (97.2 °F) 11/27/24 1300   Pulse 62 11/27/24 1300   Resp 14 11/27/24 1300   SpO2 99 % 11/27/24 1300       Anesthesia Post Evaluation    Patient participation: complete - patient participated  Level of consciousness: awake and alert  Pain management: adequate  Airway patency: patent  Cardiovascular status: acceptable  Respiratory status: acceptable  Hydration status: acceptable  Postoperative Nausea and Vomiting: none        No notable events documented.

## 2024-11-27 NOTE — ANESTHESIA PROCEDURE NOTES
Airway  Date/Time: 11/27/2024 10:15 AM  Urgency: elective    Airway not difficult    Staffing  Performed: CRNA   Authorized by: Tre Garcia MD    Performed by: ROSENDO Cadet-STANISLAV  Patient location during procedure: OR    Indications and Patient Condition  Indications for airway management: anesthesia and airway protection  Spontaneous Ventilation: absent  Sedation level: deep  Preoxygenated: yes  Patient position: sniffing  MILS maintained throughout  Mask difficulty assessment: 1 - vent by mask    Final Airway Details  Final airway type: endotracheal airway      Successful airway: ETT  Cuffed: yes   Successful intubation technique: direct laryngoscopy  Facilitating devices/methods: intubating stylet  Endotracheal tube insertion site: oral  Blade: Mart  Blade size: #4  ETT size (mm): 7.5  Cormack-Lehane Classification: grade I - full view of glottis  Placement verified by: chest auscultation and capnometry   Cuff volume (mL): 8  Measured from: lips  ETT to lips (cm): 23  Number of attempts at approach: 1  Ventilation between attempts: BVM    Additional Comments  Smooth IV induction, easy mask ventilations, atraumatic DVL/intubation, teeth/lips intact.

## 2024-12-02 ENCOUNTER — APPOINTMENT (OUTPATIENT)
Dept: UROLOGY | Facility: CLINIC | Age: 84
End: 2024-12-02
Payer: MEDICARE

## 2024-12-02 VITALS
HEART RATE: 88 BPM | OXYGEN SATURATION: 100 % | TEMPERATURE: 96.8 F | DIASTOLIC BLOOD PRESSURE: 70 MMHG | SYSTOLIC BLOOD PRESSURE: 133 MMHG | WEIGHT: 156.09 LBS | BODY MASS INDEX: 25.19 KG/M2 | RESPIRATION RATE: 20 BRPM

## 2024-12-02 DIAGNOSIS — T83.420A: ICD-10-CM

## 2024-12-02 DIAGNOSIS — K59.09 OTHER CONSTIPATION: Primary | ICD-10-CM

## 2024-12-02 PROCEDURE — 1159F MED LIST DOCD IN RCRD: CPT | Performed by: NURSE PRACTITIONER

## 2024-12-02 PROCEDURE — 1160F RVW MEDS BY RX/DR IN RCRD: CPT | Performed by: NURSE PRACTITIONER

## 2024-12-02 PROCEDURE — 99211 OFF/OP EST MAY X REQ PHY/QHP: CPT | Performed by: NURSE PRACTITIONER

## 2024-12-02 PROCEDURE — 1036F TOBACCO NON-USER: CPT | Performed by: NURSE PRACTITIONER

## 2024-12-02 PROCEDURE — 1125F AMNT PAIN NOTED PAIN PRSNT: CPT | Performed by: NURSE PRACTITIONER

## 2024-12-02 RX ORDER — POLYETHYLENE GLYCOL 3350 17 G/17G
17 POWDER, FOR SOLUTION ORAL DAILY
Qty: 3 PACKET | Refills: 0 | Status: SHIPPED | OUTPATIENT
Start: 2024-12-02 | End: 2024-12-05

## 2024-12-02 ASSESSMENT — PAIN SCALES - GENERAL: PAINLEVEL_OUTOF10: 7

## 2024-12-02 NOTE — PROGRESS NOTES
[facility]  Outpatient Clinic Note    Subjective   Gustavo Ramirez is a 83 y.o. male    History of Present Illness   Patient presenting to clinic today for POV s/p Malleable Penile Prosthesis removal  and replacement by Dr. Tamez 11/27/2024. He reports he has been doing well since surgery. He is eating and drinking, as normal. Urine has remained clear, yellow. No drainage from incision site. He is ambulating at baseline. No fevers or chills. Endorses constipation, has not taken stool softener or laxative. Drinking approximately 16 oz water daily.     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  Blebs and calcified granulomas - yearly CT.  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    Personal history of (healed) traumatic fracture 09/02/2014    History of fracture of pelvis    Prediabetes     4.26.24 A1C 5.9%    Vitamin B12 deficiency     Vitamin D deficiency      Past Surgical History:   Procedure Laterality Date    COLONOSCOPY  09/02/2014    Complete Colonoscopy    OTHER SURGICAL HISTORY  09/02/2014    Laser Coagulation Of Prostate    PENILE PROSTHESIS IMPLANT      PENILE PROSTHESIS REVISION      x 2    ROTATOR CUFF REPAIR Right     UPPER GASTROINTESTINAL ENDOSCOPY         Medications  Current Outpatient Medications on File Prior to Visit   Medication Sig Dispense Refill    chlorhexidine (Peridex) 0.12 % solution Swish for 30 seconds and spit 15mL of solution the night before and  morning of surgery 475 mL 0    cholecalciferol (Vitamin D-3) 50,000 unit capsule Take 1 capsule (50,000 Units) by mouth once a week.      cyanocobalamin (Vitamin B-12) 1,000 mcg tablet Take 1 tablet (1,000 mcg) by mouth once daily.      oxyCODONE (Roxicodone) 5 mg immediate release tablet Take 1 tablet (5 mg) by mouth every 6 hours if needed for severe pain (7 - 10) for up to 5 days. 15 tablet 0    pantoprazole (ProtoNix) 40 mg EC tablet Take 1 tablet (40 mg) by mouth once daily.      polyethylene glycol (Miralax) 17 gram packet Take 17 g by mouth once daily. 30 packet 0    sulfamethoxazole-trimethoprim (Bactrim DS) 800-160 mg tablet Take 1 tablet by mouth 2 times a day for 5 days. 10 tablet 0    tamsulosin (Flomax) 0.4 mg 24 hr capsule Take 1 capsule (0.4 mg) by mouth once daily. 90 capsule 3     No current facility-administered medications on file prior to visit.       Objective   Physicial Exam  General: Well developed, well nourished, alert and cooperative, appears in no acute distress  Eyes: Non-injected conjunctiva, sclera clear, no proptosis  Cardiac: Extremities are warm and well perfused. No edema, cyanosis or pallor.   Lungs: Breathing is easy, non-labored. Speaking in clear and complete sentences. Normal diaphragmatic movement.  MSK: Ambulatory with steady gait, unassisted  Neuro: alert and oriented to person, place and time  Psych: Demonstrates good judgement and reason, without hallucinations, abnormal affect or abnormal behaviors.  Skin: no obvious lesions, no rashes.    Lab on 11/13/2024   Component Date Value Ref Range Status    Glucose 11/13/2024 110 (H)  74 - 99 mg/dL Final    Sodium 11/13/2024 139  136 - 145 mmol/L Final    Potassium 11/13/2024 4.7  3.5 - 5.3 mmol/L Final    Chloride 11/13/2024 107  98 - 107 mmol/L Final    Bicarbonate 11/13/2024 26  21 - 32 mmol/L Final    Anion Gap 11/13/2024 11  10 - 20 mmol/L Final    Urea Nitrogen 11/13/2024 17  6 - 23 mg/dL Final    Creatinine 11/13/2024  1.16  0.50 - 1.30 mg/dL Final    eGFR 11/13/2024 63  >60 mL/min/1.73m*2 Final    Calculations of estimated GFR are performed using the 2021 CKD-EPI Study Refit equation without the race variable for the IDMS-Traceable creatinine methods.  https://jasn.asnjournals.org/content/early/2021/09/22/ASN.8695849449    Calcium 11/13/2024 9.1  8.6 - 10.3 mg/dL Final    WBC 11/13/2024 4.0 (L)  4.4 - 11.3 x10*3/uL Final    nRBC 11/13/2024 0.0  0.0 - 0.0 /100 WBCs Final    RBC 11/13/2024 5.25  4.50 - 5.90 x10*6/uL Final    Hemoglobin 11/13/2024 13.5  13.5 - 17.5 g/dL Final    Hematocrit 11/13/2024 44.9  41.0 - 52.0 % Final    MCV 11/13/2024 86  80 - 100 fL Final    MCH 11/13/2024 25.7 (L)  26.0 - 34.0 pg Final    MCHC 11/13/2024 30.1 (L)  32.0 - 36.0 g/dL Final    RDW 11/13/2024 13.7  11.5 - 14.5 % Final    Platelets 11/13/2024 204  150 - 450 x10*3/uL Final    Neutrophils % 11/13/2024 51.0  40.0 - 80.0 % Final    Immature Granulocytes %, Automated 11/13/2024 0.2  0.0 - 0.9 % Final    Immature Granulocyte Count (IG) includes promyelocytes, myelocytes and metamyelocytes but does not include bands. Percent differential counts (%) should be interpreted in the context of the absolute cell counts (cells/UL).    Lymphocytes % 11/13/2024 33.7  13.0 - 44.0 % Final    Monocytes % 11/13/2024 8.2  2.0 - 10.0 % Final    Eosinophils % 11/13/2024 6.7  0.0 - 6.0 % Final    Basophils % 11/13/2024 0.2  0.0 - 2.0 % Final    Neutrophils Absolute 11/13/2024 2.05  1.60 - 5.50 x10*3/uL Final    Percent differential counts (%) should be interpreted in the context of the absolute cell counts (cells/uL).    Immature Granulocytes Absolute, Au* 11/13/2024 0.01  0.00 - 0.50 x10*3/uL Final    Lymphocytes Absolute 11/13/2024 1.36  0.80 - 3.00 x10*3/uL Final    Monocytes Absolute 11/13/2024 0.33  0.05 - 0.80 x10*3/uL Final    Eosinophils Absolute 11/13/2024 0.27  0.00 - 0.40 x10*3/uL Final    Basophils Absolute 11/13/2024 0.01  0.00 - 0.10 x10*3/uL Final     Hemoglobin A1C 11/13/2024 6.0 (H)  See comment % Final    Estimated Average Glucose 11/13/2024 126  Not Established mg/dL Final    Color, Urine 11/13/2024 Light-Yellow  Light-Yellow, Yellow, Dark-Yellow Final    Appearance, Urine 11/13/2024 Clear  Clear Final    Specific Gravity, Urine 11/13/2024 1.018  1.005 - 1.035 Final    pH, Urine 11/13/2024 6.0  5.0, 5.5, 6.0, 6.5, 7.0, 7.5, 8.0 Final    Protein, Urine 11/13/2024 NEGATIVE  NEGATIVE, 10 (TRACE), 20 (TRACE) mg/dL Final    Glucose, Urine 11/13/2024 Normal  Normal mg/dL Final    Blood, Urine 11/13/2024 0.03 (TRACE) (A)  NEGATIVE Final    Ketones, Urine 11/13/2024 NEGATIVE  NEGATIVE mg/dL Final    Bilirubin, Urine 11/13/2024 NEGATIVE  NEGATIVE Final    Urobilinogen, Urine 11/13/2024 Normal  Normal mg/dL Final    Nitrite, Urine 11/13/2024 NEGATIVE  NEGATIVE Final    Leukocyte Esterase, Urine 11/13/2024 NEGATIVE  NEGATIVE Final    WBC, Urine 11/13/2024 NONE  1-5, NONE /HPF Final    RBC, Urine 11/13/2024 1-2  NONE, 1-2, 3-5 /HPF Final    Mucus, Urine 11/13/2024 FEW  Reference range not established. /LPF Final   Pre-Admission Testing on 11/13/2024   Component Date Value Ref Range Status    Staph/MRSA Screen Culture 11/13/2024 No Staphylococcus aureus isolated   Final    Ventricular Rate 11/13/2024 67  BPM Final    Atrial Rate 11/13/2024 67  BPM Final    ND Interval 11/13/2024 168  ms Final    QRS Duration 11/13/2024 102  ms Final    QT Interval 11/13/2024 404  ms Final    QTC Calculation(Bazett) 11/13/2024 426  ms Final    P Axis 11/13/2024 72  degrees Final    R Axis 11/13/2024 21  degrees Final    T Axis 11/13/2024 30  degrees Final    QRS Count 11/13/2024 11  beats Final    Q Onset 11/13/2024 224  ms Final    P Onset 11/13/2024 140  ms Final    P Offset 11/13/2024 197  ms Final    T Offset 11/13/2024 426  ms Final    QTC Fredericia 11/13/2024 419  ms Final      Review of Systems  All other systems have been reviewed and are negative for complaint.    Assessment and  Plan   - Post Operative Constipation  Recommend Colace twice daily and MiraLax once daily. Prescriptions sent to [facility]  We discussed increasing water intake, at least 64 oz daily   Follow up scheduled with Dr. Tamez 12/16/2024    All questions and concerns were addressed. Patient verbalizes understanding and has no other questions at this time.     Porsche Carey-- ARLEEN ROBBINS  Office Phone:  [phone]

## 2024-12-07 NOTE — OP NOTE
Dislocated Malleable Penile Prosthesis Removal and replacement, repair of corporal injury, adjacent tissue transfer <10cm, peudenal nerve block Operative Note     Date: 2024  OR Location: [facility] OR    Name: Gustavo Ramirez, : 1940, Age: 83 y.o., MRN: [id number], Sex: male    Diagnosis  Pre-op Diagnosis      * Displacement of penile prosthesis implant, sequela [T83.420S] Post-op Diagnosis     * Displacement of penile prosthesis implant, sequela [T83.420S]     Procedures  Dislocated Malleable Penile Prosthesis Removal and replacement, repair of corporal injury, adjacent tissue transfer <10cm, peudenal nerve block  95522 - [place] RMVL & RPLCMT NON-NFLTBL/NFLTBL PENILE PROSTHESI    WI NJX C/P/A CAVERNOSA W/PHARMACOLOGIC AGT [51779]  WI INSJ PENILE PROSTHESIS NON-INFLATABLE SEMI-RIGID [27259]  Surgeons      * Vineet Tamez - Primary    Resident/Fellow/Other Assistant:  Surgeons and Role:     * Carlee Jones MD - Assisting     * Krish Buenrostro MD - Resident - Assisting    Staff:   Surgical Assistant:   Circulator: Alfreda Pruitt Person: Pamella Ames Circulator: Gloria    Anesthesia Staff: Anesthesiologist: Tre Garcia MD  CRNA: ROSENDO Cadet-CRNA  C-AA: ALBERT Busby    Procedure Summary  Anesthesia: General  ASA: II  Estimated Blood Loss: 25 mL  Intra-op Medications:   Administrations occurring from 0930 to 1230 on 24:   Medication Name Total Dose   dexAMETHasone (Decadron) injection 4 mg   bupivacaine PF 0.5 % (Marcaine) 0.5 % (5 mg/mL) injection 29 mL   dexAMETHasone (Decadron) injection 4 mg/mL 4 mg   fentaNYL (Sublimaze) injection 50 mcg/mL 50 mcg   ketorolac (Toradol) injection 30 mg 15 mg   lactated Ringer's infusion Cannot be calculated   lidocaine (cardiac) injection 2% prefilled syringe 60 mg   midazolam PF (Versed) injection 1 mg/mL 1 mg   ondansetron (Zofran) 2 mg/mL injection 4 mg   phenylephrine 100 mcg/mL syringe 10 mL (prefilled) 100 mcg   propofol  (Diprivan) injection 10 mg/mL 110 mg   rocuronium (ZeMuron) 50 mg/5 mL injection 70 mg   sugammadex (Bridion) 200 mg/2 mL injection 200 mg              Anesthesia Record               Intraprocedure I/O Totals          Intake    lactated Ringer's infusion 500.00 mL    Total Intake 500 mL       Output    Est. Blood Loss 5 mL    Total Output 5 mL       Net    Net Volume 495 mL          Specimen: No specimens collected              Drains and/or Catheters:   [REMOVED] Urethral Catheter Non-latex 16 Fr. (Removed)       Tourniquet Times:         Implants:  Implants       Type Name Action Serial No.      Penile Prosthesis PROSTHESIS KIT, 11MM TACTRA, TORIEAVALERIA - SNA - [id number] Implanted NA              Findings: Perforated penile prosthesis device that had migrated towards the rectum on the left side.  There was a pseudocapsule around the device.  The device was carefully removed and a new 22 cm device with standard rear-tip was placed on the left side and secured to the pubic bone.    Indications: Gustavo Ramirez is an 83 y.o. male who is having surgery for Displacement of penile prosthesis implant, sequela [T83.420S].     The patient was seen in the preoperative area. The risks, benefits, complications, treatment options, non-operative alternatives, expected recovery and outcomes were discussed with the patient. The possibilities of reaction to medication, pulmonary aspiration, injury to surrounding structures, bleeding, recurrent infection, the need for additional procedures, failure to diagnose a condition, and creating a complication requiring transfusion or operation were discussed with the patient. The patient concurred with the proposed plan, giving informed consent.  The site of surgery was properly noted/marked if necessary per policy. The patient has been actively warmed in preoperative area. Preoperative antibiotics have been ordered and given within 1 hours of incision. Venous thrombosis prophylaxis have  been ordered including bilateral sequential compression devices    Procedure Details:   The patient was prepped and draped in a standard sterile manner.  A routine prep protocol for penile prosthesis device was practiced.  A Cummins catheter was placed in the bladder and the bladder was drained.  We then made a midline perineal incision and dissected down to the corpora.  At this point careful dissection was performed to remove the urethra out of the way and we then visualized the perforation of the left malleable penile prosthesis through the proximal corpora.  There was a significant capsule around the perforated device.  This was carefully dissected.  We then incised the capsule with electrocautery and the entirety of the perforated device was removed.  At this point we measured the inside opening of the corpora extending to the pubic bone which was 22 cm.  There was significant scar tissue which was sharply excised.  We elected to place a AMS tetra device with a standard rear-tip.  Subsequently, the device was inserted into the space after copious irrigation was performed.  Using a 2-0 PDS we secured the rear part of the device to the pubic bone.  Subsequently a flap was raised off of the capsule that was dissected and this was used as a reinforcement over the perforated corporal area.  We then closed the wound in multiple layers bringing the overlying dartos and subcutaneous tissue for reinforcement.  The skin was closed with a running 3-0 Monocryl in a horizontal mattress.    Complications:  None; patient tolerated the procedure well.    Disposition: PACU - hemodynamically stable.  Condition: stable         Task Performed by RNFA or Surgical Assistant:  None          Additional Details: Standard postoperative protocol    Attending Attestation:    Yes

## 2024-12-27 ENCOUNTER — APPOINTMENT (OUTPATIENT)
Dept: UROLOGY | Facility: CLINIC | Age: 84
End: 2024-12-27
Payer: MEDICARE

## 2024-12-27 VITALS — TEMPERATURE: 96.1 F | BODY MASS INDEX: 23.54 KG/M2 | WEIGHT: 150 LBS | HEIGHT: 67 IN

## 2024-12-27 DIAGNOSIS — T83.420D: Primary | ICD-10-CM

## 2024-12-27 PROCEDURE — 99024 POSTOP FOLLOW-UP VISIT: CPT | Performed by: UROLOGY

## 2024-12-27 PROCEDURE — 1159F MED LIST DOCD IN RCRD: CPT | Performed by: UROLOGY

## 2024-12-27 PROCEDURE — 1126F AMNT PAIN NOTED NONE PRSNT: CPT | Performed by: UROLOGY

## 2024-12-27 ASSESSMENT — PAIN SCALES - GENERAL: PAINLEVEL_OUTOF10: 0-NO PAIN

## 2024-12-27 NOTE — PROGRESS NOTES
Here for postop check    S/p   Dislocated Malleable Penile Prosthesis Removal and replacement, repair of corporal injury, adjacent tissue transfer <10cm, peudenal nerve block  45205 - [place] RMVL & RPLCMT NON-NFLTBL/NFLTBL PENILE PROSTHESI       Doing well  -pain controlled  -No fevers/chills/nausea/vomiting  -urinating ok  -no pus or drainage from wound      Exam:  Testicles descended bilaterally, nontender, no masses  Vasa palpable bilaterally  Penis circ'd, no lesions, no plaques  Penile implant in place, malleable, in good position  Perineal incision well healing, implant feels in good position       Sp removal /replacement of dislocated left malleable, doing well  Warning signs reviewed  No sex until 6 weeks    Fu in 3 months

## 2025-04-01 ENCOUNTER — APPOINTMENT (OUTPATIENT)
Dept: UROLOGY | Facility: CLINIC | Age: 85
End: 2025-04-01
Payer: MEDICARE

## 2025-04-15 ENCOUNTER — APPOINTMENT (OUTPATIENT)
Dept: UROLOGY | Facility: CLINIC | Age: 85
End: 2025-04-15
Payer: MEDICARE

## 2025-04-15 VITALS — TEMPERATURE: 98.6 F

## 2025-04-15 DIAGNOSIS — T83.420D: Primary | ICD-10-CM

## 2025-04-15 PROCEDURE — 1126F AMNT PAIN NOTED NONE PRSNT: CPT | Performed by: UROLOGY

## 2025-04-15 PROCEDURE — 99213 OFFICE O/P EST LOW 20 MIN: CPT | Performed by: UROLOGY

## 2025-04-15 PROCEDURE — 1036F TOBACCO NON-USER: CPT | Performed by: UROLOGY

## 2025-04-15 PROCEDURE — 1159F MED LIST DOCD IN RCRD: CPT | Performed by: UROLOGY

## 2025-04-15 ASSESSMENT — PAIN SCALES - GENERAL: PAINLEVEL_OUTOF10: 0-NO PAIN

## 2025-04-15 NOTE — PROGRESS NOTES
Here for FUV    Last Visit: 12/27/24  Sp removal /replacement of dislocated left malleable, doing well  Warning signs reviewed  No sex until 6 weeks  Fu in 3 months    Today's Visit  Discharge summary reviewed  Cardiology notes and echo reviewed    S/p Dislocated Malleable Penile Prosthesis Removal and replacement, repair of corporal injury, adjacent tissue transfer <10cm, peudenal nerve block  65888 - [place] RMVL & RPLCMT NON-NFLTBL/NFLTBL PENILE PROSTHESIS     Doing well  -pain controlled  -No fevers/chills/nausea/vomiting  -urinating ok  -no pus or drainage from wound      Exam:  Testicles descended bilaterally, nontender, no masses  Vasa palpable bilaterally  Penis circ'd, no lesions, no plaques  Penile implant in place, malleable, in good position  Perineal incision well healing, implant feels in good position     Assessment/Plan:   Sp removal /replacement of dislocated left malleable, doing well  Warning signs reviewed  Implant looks good  Safe to resume sex    Fu prn

## (undated) DEVICE — SUTURE, MONOCRYL, 3-0, 18 IN, PS2, UNDYED

## (undated) DEVICE — DRAPE, TOWEL, STERI DRAPE, 17 X 11 IN, PLASTIC, STERILE

## (undated) DEVICE — SYRINGE, 50 CC, LUER LOCK

## (undated) DEVICE — WOUND SYSTEM, DEBRIDEMENT & CLEANING, O.R DUOPAK

## (undated) DEVICE — SYRINGE, 50 CC, IRRIGATION, CATHETER TIP, DISPOSABLE, STERILE, LF

## (undated) DEVICE — RETRACTION SYSTEM, SKW DEEP SCROTAL

## (undated) DEVICE — Device

## (undated) DEVICE — TUBING, SUCTION, CONNECTING, STERILE 0.25 X 120 IN., LF

## (undated) DEVICE — STATLOCK, FOLEY SWIVEL, SILICONE TRICOT

## (undated) DEVICE — DRESSING, ADHESIVE, ISLAND, TELFA, 2 X 3.75 IN, LF

## (undated) DEVICE — GLOVE, SURGICAL, PROTEXIS PI BLUE W/NEUTHERA, 7.5, PF, LF

## (undated) DEVICE — GOWN, SURGICAL, SMARTGOWN, XLARGE, STERILE

## (undated) DEVICE — DRESSING, GAUZE, FLUFF, 1 PLY, 18 X 36 IN

## (undated) DEVICE — DRAPE, INCISE, ANTIMICROBIAL, IOBAN 2, LARGE, 17 X 23 IN, DISPOSABLE, STERILE

## (undated) DEVICE — APPLICATOR, CHLORAPREP, W/ORANGE TINT, 26ML

## (undated) DEVICE — DRESSING, TRANSPARENT, TEGADERM, 4 X 4-3/4 IN

## (undated) DEVICE — KIT, MINOR, DOUBLE BASIN

## (undated) DEVICE — COUNTER, NEEDLE, FOAM BLOCK, POP-N-COUNT, W/BLADEGUARD, W/ADHESIVE 40 COUNT, RED

## (undated) DEVICE — BANDAGE, GAUZE, CONFORMING, KERLIX, 6 PLY, 4.5 IN X 4.1 YD

## (undated) DEVICE — CATHETER TRAY, SURESTEP, 16FR, URINE METER W/STATLOCK

## (undated) DEVICE — SOLUTION, PREP, PVP IODINE, FLIP TOP, 4OZ

## (undated) DEVICE — SUTURE, ETHILON, 2-0, 18 IN, FS, BLACK, BX/12

## (undated) DEVICE — NEEDLE, HYPODERMIC, MONOJECT, 18 G X 1.5 IN

## (undated) DEVICE — DRAPE, INSTRUMENT, W/POUCH, STERI DRAPE, 7 X 11 IN, DISPOSABLE, STERILE

## (undated) DEVICE — ADHESIVE, SKIN, DERMABOND ADVANCED, 15CM, PEN-STYLE

## (undated) DEVICE — DRAPE, ISOLATION, BAG, DRAW STRING CLOSURE, STERI DRAPE, LARGE, 20 X 20 IN, DISPOSABLE, STERILE

## (undated) DEVICE — TOWEL PACK, STERILE, 4/PACK, BLUE

## (undated) DEVICE — TIP, SUCTION, YANKAUER, BULB, ADULT

## (undated) DEVICE — NEEDLE, HYPODERMIC, 23 GA X 1.5 IN

## (undated) DEVICE — SYRINGE, LUER LOCK, 12ML

## (undated) DEVICE — KIT, GUIDEWIRE BUCKET, W/LID, F/FETAL REMAINS

## (undated) DEVICE — SUPPORTER, ATHLETIC, ADULT, X-LARGE

## (undated) DEVICE — LUBRICANT, SURGILUBE, STERILE, 2OZ

## (undated) DEVICE — SUTURE, VICRYL PLUS, 2-0, 27IN, UR-6, VIOLET, BRAIDED

## (undated) DEVICE — SYRINGE, 60 CC, IRRIGATION, BULB, CONTRO-BULB, PAPER POUCH

## (undated) DEVICE — SPONGE, LAP, XRAY DECT, 18IN X 18IN, W/MASTER DMT, STERILE

## (undated) DEVICE — GLOVE, SURGICAL, PROTEXIS PI , 7.0, PF, LF

## (undated) DEVICE — DRESSING, NON-ADHERENT, TELFA, OUCHLESS, 3 X 8 IN, STERILE

## (undated) DEVICE — SOLUTION, IRRIGATION, SODIUM CHLORIDE 0.9%, 1000 ML, POUR BOTTLE

## (undated) DEVICE — SYRINGE, 20 CC, LUER LOCK, MONOJECT, W/O CAP, LF